# Patient Record
Sex: MALE | Race: OTHER | HISPANIC OR LATINO | Employment: UNEMPLOYED | URBAN - METROPOLITAN AREA
[De-identification: names, ages, dates, MRNs, and addresses within clinical notes are randomized per-mention and may not be internally consistent; named-entity substitution may affect disease eponyms.]

---
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
## 2024-05-14 NOTE — ASSESSMENT & PLAN NOTE
- complaining of severe jaw pain, difficulty with speaking and oral intake  - CT head and neck unremarkable  - MRI with nonspecific changes but was limited by motion artifact    - continue to monitor clinically; significantly improved IV intact